# Patient Record
Sex: FEMALE | Race: WHITE
[De-identification: names, ages, dates, MRNs, and addresses within clinical notes are randomized per-mention and may not be internally consistent; named-entity substitution may affect disease eponyms.]

---

## 2018-05-29 ENCOUNTER — HOSPITAL ENCOUNTER (OUTPATIENT)
Dept: HOSPITAL 89 - RAD | Age: 63
End: 2018-05-29
Attending: FAMILY MEDICINE
Payer: COMMERCIAL

## 2018-05-29 DIAGNOSIS — I10: Primary | ICD-10-CM

## 2018-05-29 DIAGNOSIS — R07.9: ICD-10-CM

## 2018-05-29 DIAGNOSIS — R06.02: ICD-10-CM

## 2018-05-29 PROCEDURE — 93325 DOPPLER ECHO COLOR FLOW MAPG: CPT

## 2018-05-29 PROCEDURE — 93350 STRESS TTE ONLY: CPT

## 2018-05-29 PROCEDURE — 93017 CV STRESS TEST TRACING ONLY: CPT

## 2018-05-29 NOTE — RT STRESS TEST REPORT
[place] [facility] [place] 

 

PATIENT NAME: ROSEMARY ROE

: [id number]

MR: [id number]

V: [id number]

EXAM DATE: 

ORDERING PHYSICIAN: RUBÉN RODRIGUEZ

TECHNOLOGIST: Wally

 

Acquisition Time: 2018  14:23:28

Total Exercise Time: 00:12:00

Test Indications: Syncope

Medications: BLOOD PRESSURE

              

              

              

              

              

              

              

              

              

Protocol: NICKY 2         

Max HR: 151 BPM  96% of  Pred: 157 BPM

Max BP: 188/087 mmHG

Max Work Load: 13.4 METS

 

 

NSR, no ST-T abnormalities

No ST-T changes suggestive of ischemia seen with stress

Rare PACs

See seperate report for Stress echo interpretation.

Confirmed by SHA UMANZOR ([id number]) on 2018 3:06:07 PM

 

Referred By:             Overread By: SHA UMANZOR

## 2018-05-30 NOTE — RADIOLOGY IMAGING REPORT
FACILITY: [facility] - [place] 

 

PATIENT NAME: ROSEMARY ROE

: [id number]

MR: [id number]

V: [id number]

EXAM DATE: [id number]

ORDERING PHYSICIAN: RUBÉN RODRIGUEZ

TECHNOLOGIST: Ahsan Mcneil

 

PROCEDURE:  STRESS ECHOCARDIOGRAPHY

 

COMPARISON:  None.

 

INDICATIONS:  CHEST PAIN

 

RESTING ECG: Normal sinus rhythm, no ST or T wave abnormalities.

STRESS ECG: No ST or T wave changes suggestion of ischemia.

ARRHYTHMIAS: Rare PAC's.

STRESS PORTION OF TEST: Patient exercised with Jd protocol for 12 

min achieving 13.4 mets. Had a resting heartrate of 80 BPM with a 

maximum heartrate of 151 BPM representing 96% of age predicted max 

heartrate. Resting blood pressure 151/99, maximum blood pressure 

188/87. No reports of chest pain during the test.

RESTING ECHOCARDIOGRAM: Ejection fraction looks to be 55% with no focal 

wall motion abnormalities. There is no significant valvular dysfunction 

noted either.

STRESS ECHO: Normal augmentation of all walls with exertion, estimated 

EF >70% with exercise. 

 

CONCLUSION:  

 

1. Normal resting & stress ECG with only rare PAC's.

2. Excellent functional capacity reaching 12 min of exercise with Jd 

protocol.

3. Normal stress echo with normal resting wall motion & normal 

augmentation with stress.

4. Based on results of the test overall patient is low risk (<1%) for 

annual cardiovascular event.

 

 

 

 

Dictated by: Ferny Salas M.D. on 2018 at 15:30   

Transcribed by: ELLIS on 2018 at 6:55    

Approved by: Ferny Salas M.D. on 2018 at 14:48   

[facility]